# Patient Record
Sex: FEMALE | Race: OTHER
[De-identification: names, ages, dates, MRNs, and addresses within clinical notes are randomized per-mention and may not be internally consistent; named-entity substitution may affect disease eponyms.]

---

## 2018-01-26 ENCOUNTER — HOSPITAL ENCOUNTER (INPATIENT)
Dept: HOSPITAL 72 - EMR | Age: 73
LOS: 1 days | Discharge: HOME | DRG: 392 | End: 2018-01-27
Payer: MEDICARE

## 2018-01-26 VITALS — DIASTOLIC BLOOD PRESSURE: 63 MMHG | SYSTOLIC BLOOD PRESSURE: 156 MMHG

## 2018-01-26 VITALS — DIASTOLIC BLOOD PRESSURE: 55 MMHG | SYSTOLIC BLOOD PRESSURE: 137 MMHG

## 2018-01-26 VITALS — SYSTOLIC BLOOD PRESSURE: 125 MMHG | DIASTOLIC BLOOD PRESSURE: 59 MMHG

## 2018-01-26 VITALS — SYSTOLIC BLOOD PRESSURE: 137 MMHG | DIASTOLIC BLOOD PRESSURE: 53 MMHG

## 2018-01-26 VITALS — BODY MASS INDEX: 23.56 KG/M2 | HEIGHT: 60 IN | WEIGHT: 120 LBS

## 2018-01-26 DIAGNOSIS — Z88.8: ICD-10-CM

## 2018-01-26 DIAGNOSIS — R19.00: Primary | ICD-10-CM

## 2018-01-26 DIAGNOSIS — E78.5: ICD-10-CM

## 2018-01-26 DIAGNOSIS — E11.9: ICD-10-CM

## 2018-01-26 DIAGNOSIS — C85.90: ICD-10-CM

## 2018-01-26 DIAGNOSIS — I10: ICD-10-CM

## 2018-01-26 DIAGNOSIS — R10.13: ICD-10-CM

## 2018-01-26 LAB
ADD MANUAL DIFF: NO
ALBUMIN SERPL-MCNC: 3.9 G/DL (ref 3.4–5)
ALBUMIN/GLOB SERPL: 0.9 {RATIO} (ref 1–2.7)
ALP SERPL-CCNC: 93 U/L (ref 46–116)
ALT SERPL-CCNC: 13 U/L (ref 12–78)
ANION GAP SERPL CALC-SCNC: 9 MMOL/L (ref 5–15)
APPEARANCE UR: CLEAR
APTT PPP: (no result) S
AST SERPL-CCNC: 24 U/L (ref 15–37)
BASOPHILS NFR BLD AUTO: 0.9 % (ref 0–2)
BILIRUB SERPL-MCNC: 0.4 MG/DL (ref 0.2–1)
BUN SERPL-MCNC: 15 MG/DL (ref 7–18)
CALCIUM SERPL-MCNC: 9.7 MG/DL (ref 8.5–10.1)
CHLORIDE SERPL-SCNC: 98 MMOL/L (ref 98–107)
CO2 SERPL-SCNC: 25 MMOL/L (ref 21–32)
CREAT SERPL-MCNC: 1 MG/DL (ref 0.55–1.3)
EOSINOPHIL NFR BLD AUTO: 6.5 % (ref 0–3)
ERYTHROCYTE [DISTWIDTH] IN BLOOD BY AUTOMATED COUNT: 11.8 % (ref 11.6–14.8)
GLOBULIN SER-MCNC: 4.3 G/DL
GLUCOSE UR STRIP-MCNC: NEGATIVE MG/DL
HCT VFR BLD CALC: 35.3 % (ref 37–47)
HGB BLD-MCNC: 12.1 G/DL (ref 12–16)
KETONES UR QL STRIP: NEGATIVE
LEUKOCYTE ESTERASE UR QL STRIP: (no result)
LYMPHOCYTES NFR BLD AUTO: 18 % (ref 20–45)
MCV RBC AUTO: 90 FL (ref 80–99)
MONOCYTES NFR BLD AUTO: 8.5 % (ref 1–10)
NEUTROPHILS NFR BLD AUTO: 66 % (ref 45–75)
NITRITE UR QL STRIP: NEGATIVE
PH UR STRIP: 6.5 [PH] (ref 4.5–8)
PLATELET # BLD: 281 K/UL (ref 150–450)
POTASSIUM SERPL-SCNC: 4.1 MMOL/L (ref 3.5–5.1)
PROT UR QL STRIP: NEGATIVE
RBC # BLD AUTO: 3.93 M/UL (ref 4.2–5.4)
SODIUM SERPL-SCNC: 131 MMOL/L (ref 136–145)
SP GR UR STRIP: 1.01 (ref 1–1.03)
UROBILINOGEN UR-MCNC: NORMAL MG/DL (ref 0–1)
WBC # BLD AUTO: 8 K/UL (ref 4.8–10.8)

## 2018-01-26 PROCEDURE — 36415 COLL VENOUS BLD VENIPUNCTURE: CPT

## 2018-01-26 PROCEDURE — 87086 URINE CULTURE/COLONY COUNT: CPT

## 2018-01-26 PROCEDURE — 81003 URINALYSIS AUTO W/O SCOPE: CPT

## 2018-01-26 PROCEDURE — 85025 COMPLETE CBC W/AUTO DIFF WBC: CPT

## 2018-01-26 PROCEDURE — 74177 CT ABD & PELVIS W/CONTRAST: CPT

## 2018-01-26 PROCEDURE — 83690 ASSAY OF LIPASE: CPT

## 2018-01-26 PROCEDURE — 87181 SC STD AGAR DILUTION PER AGT: CPT

## 2018-01-26 PROCEDURE — 99285 EMERGENCY DEPT VISIT HI MDM: CPT

## 2018-01-26 PROCEDURE — 80053 COMPREHEN METABOLIC PANEL: CPT

## 2018-01-26 PROCEDURE — 84484 ASSAY OF TROPONIN QUANT: CPT

## 2018-01-26 PROCEDURE — 82962 GLUCOSE BLOOD TEST: CPT

## 2018-01-26 RX ADMIN — INSULIN ASPART SCH UNITS: 100 INJECTION, SOLUTION INTRAVENOUS; SUBCUTANEOUS at 20:17

## 2018-01-26 RX ADMIN — INSULIN ASPART SCH UNITS: 100 INJECTION, SOLUTION INTRAVENOUS; SUBCUTANEOUS at 20:31

## 2018-01-26 RX ADMIN — HEPARIN SODIUM SCH UNITS: 5000 INJECTION INTRAVENOUS; SUBCUTANEOUS at 20:16

## 2018-01-26 NOTE — EMERGENCY ROOM REPORT
History of Present Illness


General


Chief Complaint:  Abdominal Pain


Source:  Patient





Present Illness


HPI


72-year-old female presents ED for evaluation.  Patient presenting with 

abdominal pain.  Epigastric, 7/10, nonradiating.  Worse with eating.  On and 

off the last 2 months.  Unrelieved with medications.  Denies chest pain or 

shortness of breath.  Denies nausea or vomiting.  No other aggravating 

relieving factors.  Denies any other associated symptoms


Allergies:  


Coded Allergies:  


     FLUORESCEIN (Verified  Allergy, Unknown, 1/26/18)





Patient History


Past Medical History:  DM, HTN


Past Surgical History:  none


Pertinent Family History:  none


Social History:  Denies: smoking, alcohol use, drug use


Pregnant Now:  No


Immunizations:  UTD


Reviewed Nursing Documentation:  PMH: Agreed, PSxH: Agreed





Nursing Documentation-PMH


Past Medical History:  No History, Except For


Hx Hypertension:  Yes


Hx Diabetes:  Yes





Review of Systems


All Other Systems:  negative except mentioned in HPI





Physical Exam





Vital Signs








  Date Time  Temp Pulse Resp B/P (MAP) Pulse Ox O2 Delivery O2 Flow Rate FiO2


 


1/26/18 10:27 97.9 80 16 182/72 98 Room Air  








Sp02 EP Interpretation:  reviewed, normal


General Appearance:  no apparent distress, alert, GCS 15, non-toxic


Head:  normocephalic, atraumatic


Eyes:  bilateral eye normal inspection, bilateral eye PERRL


ENT:  hearing grossly normal, normal pharynx, no angioedema, normal voice


Neck:  full range of motion, supple/symm/no masses


Respiratory:  chest non-tender, lungs clear, normal breath sounds, speaking 

full sentences


Cardiovascular #1:  regular rate, rhythm, no edema


Cardiovascular #2:  2+ carotid (R), 2+ carotid (L), 2+ radial (R), 2+ radial (L)

, 2+ dorsalis pedis (R), 2+ dorsalis pedis (L)


Gastrointestinal:  normal bowel sounds, soft, non-distended, no guarding, no 

rebound, tenderness


Rectal:  deferred


Genitourinary:  normal inspection, no CVA tenderness


Musculoskeletal:  back normal, gait/station normal, normal range of motion, non-

tender


Neurologic:  alert, oriented x3, responsive, motor strength/tone normal, 

sensory intact, speech normal


Psychiatric:  judgement/insight normal, memory normal, mood/affect normal, no 

suicidal/homicidal ideation


Reflexes:  3+ bicep (R), 3+ bicep (L), 3+ tricep (R), 3+ tricep (L), 3+ knee (R)

, 3+ knee (L)


Skin:  normal color, no rash, warm/dry, well hydrated


Lymphatic:  no adenopathy





Medical Decision Making


Diagnostic Impression:  


 Primary Impression:  


 Abdominal mass


 Qualified Codes:  R19.00 - Intra-abdominal and pelvic swelling, mass and lump, 

unspecified site


 Additional Impression:  


 Abdominal pain


 Qualified Codes:  R10.9 - Unspecified abdominal pain


ER Course


Hospital Course 


72-year-old female presents to ED with abdominal pain x2 month





Differential diagnoses include: BPH, cystitis, pyelonephritis, kidney stone 





Clinical course


Patient placed on stretcher.  Cardiac monitor.  After initial history and 

physical I ordered labs, IV fluids, UA, pain medication and CT scan 





Labs - no leukocytosis, Hb/Hct stable, electrolytes ok


CT abdomen and pelvis - abdominal mass noted





patient contiues to have pain despite adequate analgesia





Case discussed with Dr. Teran (admits for Dr Broderick) and he agreed to accept 

the patient to his service for further care and support 





I feel this is a highly complex case requiring extensive working including EKG/

Rhythm strip, Xray/CT/US, Blood/urine lab work, repeat exams while in ED, and 

administration of strong opiates/narcotics for pain control, admission to 

hospital or close patient follow up.  





Diagnosis - abdominal pain, abdominal mass





Patient admitted to floor in serious condition





Labs








Test


  1/26/18


10:42


 


White Blood Count


  8.0 K/UL


(4.8-10.8)


 


Red Blood Count


  3.93 M/UL


(4.20-5.40)


 


Hemoglobin


  12.1 G/DL


(12.0-16.0)


 


Hematocrit


  35.3 %


(37.0-47.0)


 


Mean Corpuscular Volume 90 FL (80-99) 


 


Mean Corpuscular Hemoglobin


  30.7 PG


(27.0-31.0)


 


Mean Corpuscular Hemoglobin


Concent 34.2 G/DL


(32.0-36.0)


 


Red Cell Distribution Width


  11.8 %


(11.6-14.8)


 


Platelet Count


  281 K/UL


(150-450)


 


Mean Platelet Volume


  5.9 FL


(6.5-10.1)


 


Neutrophils (%) (Auto)


  66.0 %


(45.0-75.0)


 


Lymphocytes (%) (Auto)


  18.0 %


(20.0-45.0)


 


Monocytes (%) (Auto)


  8.5 %


(1.0-10.0)


 


Eosinophils (%) (Auto)


  6.5 %


(0.0-3.0)


 


Basophils (%) (Auto)


  0.9 %


(0.0-2.0)


 


Urine Color Pale yellow 


 


Urine Appearance Clear 


 


Urine pH 6.5 (4.5-8.0) 


 


Urine Specific Gravity


  1.010


(1.005-1.035)


 


Urine Protein


  Negative


(NEGATIVE)


 


Urine Glucose (UA)


  Negative


(NEGATIVE)


 


Urine Ketones


  Negative


(NEGATIVE)


 


Urine Occult Blood 1+ (NEGATIVE) 


 


Urine Nitrite


  Negative


(NEGATIVE)


 


Urine Bilirubin


  Negative


(NEGATIVE)


 


Urine Urobilinogen


  Normal MG/DL


(0.0-1.0)


 


Urine Leukocyte Esterase 3+ (NEGATIVE) 


 


Urine RBC


  0-2 /HPF (0 -


2)


 


Urine WBC


  10-15 /HPF (0


- 2)


 


Urine Squamous Epithelial


Cells Few /LPF


(NONE/OCC)


 


Urine Bacteria


  Few /HPF


(NONE)


 


Sodium Level


  131 MMOL/L


(136-145)


 


Potassium Level


  4.1 MMOL/L


(3.5-5.1)


 


Chloride Level


  98 MMOL/L


()


 


Carbon Dioxide Level


  25 MMOL/L


(21-32)


 


Anion Gap


  9 mmol/L


(5-15)


 


Blood Urea Nitrogen


  15 mg/dL


(7-18)


 


Creatinine


  1.0 MG/DL


(0.55-1.30)


 


Estimat Glomerular Filtration


Rate  mL/min (>60) 


 


 


Glucose Level


  106 MG/DL


()


 


Calcium Level


  9.7 MG/DL


(8.5-10.1)


 


Total Bilirubin


  0.4 MG/DL


(0.2-1.0)


 


Aspartate Amino Transf


(AST/SGOT) 24 U/L (15-37) 


 


 


Alanine Aminotransferase


(ALT/SGPT) 13 U/L (12-78) 


 


 


Alkaline Phosphatase


  93 U/L


()


 


Troponin I


  0.000 ng/mL


(0.000-0.056)


 


Total Protein


  8.2 G/DL


(6.4-8.2)


 


Albumin


  3.9 G/DL


(3.4-5.0)


 


Globulin 4.3 g/dL 


 


Albumin/Globulin Ratio 0.9 (1.0-2.7) 


 


Lipase


  133 U/L


()








CT/MRI/US Diagnostic Results


CT/MRI/US Diagnostic Results :  


   Imaging Test Ordered:  CT A/P


   Impression


Extensive confluent retroperitoneal and mesenteric root mass/adenopathy,


as detailed above. Most likely represents lymphoma. Other differential 

possibilities


include sarcoma, GIST. Tissue sampling is recommended





Last Vital Signs








  Date Time  Temp Pulse Resp B/P (MAP) Pulse Ox O2 Delivery O2 Flow Rate FiO2


 


1/26/18 13:50  76 13 137/53 98 Room Air  


 


1/26/18 10:53 97.9       








Status:  improved


Disposition:  ADMITTED AS INPATIENT


Condition:  Serious


Scripts


Unable to Obtain Active Prescriptions or Reported Meds


Referrals:  


NON PHYSICIAN (PCP)











AMBER RAMIREZ M.D. Jan 26, 2018 14:45

## 2018-01-26 NOTE — DIAGNOSTIC IMAGING REPORT
Clinical Indication: Lower abdominal pain

 

Technique:   No oral contrast utilized, per emergency room physician request  IV

administration nonionic contrast. Venous phase spiral acquisition obtained through

the abdomen and pelvis. Multiplanar reconstructions were generated. Total dose length

product 603.96 mGycm. CTDIvol(s) 13.48 mGy. Dose reduction achieved using automated

exposure control

 

 

Comparison: none

 

Findings: Extensive contiguous confluent mass/adenopathy in the retroperitoneum and

mesenteric root. These demonstrate homogeneous attenuation. The retroperitoneal

component surrounds the abdominal aorta, the bilateral renal arteries, the inferior

mesenteric artery, and the bilateral common iliac arteries. It compresses and

probably occludes the left renal vein. It extends from the level of the celiac origin

to below the aortic bifurcation. This mass measures overall approximately 9 cm

transverse by 6 cm AP by 17 cm craniocaudad.

 

Largely  from the retroperitoneal mass but connected to 2 a by a short

connection cephalad is a is interpreted mass which measures approximately 8.7 cm

transverse by 4.3 cm AP by 13 cm craniocaudad. This to large extent surrounds the

superior mesenteric artery but is mostly  from it by a fat plane. Some of

the superior mesenteric artery branches are completely surrounded by the mass, as is

the distal termination. It also contacts the anterior aspect of the superior

mesenteric vein and compresses it somewhat. A tail of this mass extends into the

rakesh hepatis, where it surrounds the posterior aspect of the portal vein. It

displaces the pancreas anteriorly and is contiguous with the posterior aspect of the

splenic vein. The splenic artery is completely surrounded by this mass, as is a

portion of the origin of the left gastric artery. This also extends cephalad into the

lesser sac. There are some contiguous but probably discrete rakesh hepatis nodes as

well There is also retrocrural lymphadenopathy which appears to be separate from the

other lesions. Para-aortic lymphadenopathy is seen adjacent to the lower thoracic

esophagus. A few discrete nodules are also seen in the mesenteric root.

 

The liver is unremarkable. The gallbladder contains multiple gallstones. The bile

ducts are nondilated. The pancreas, spleen, adrenals are unremarkable. Right kidney

demonstrates subcentimeter low-attenuation lesions which are too small to

characterize. There is mild fullness to the right renal collecting system but no

katrin hydronephrosis. Left kidney is unremarkable. Uterus and adnexal structures are

unremarkable. No pelvic adenopathy demonstrated.

 

The appendix is not identified, but no findings to suggest acute appendicitis are

evident. There are scattered colonic diverticula. No evidence of diverticulitis. No

small bowel distention. Trace free fluid is seen within the pelvic cul-de-sac. The

distal esophagus is slightly thickened. The stomach is somewhat displaced on its

lesser curvature by the above-described mass as well as the antrum being displaced

anteriorly, but is otherwise unremarkable duodenum is unremarkable. The distal

duodenum and ligament of Treitz traverses between the retroperitoneal and the

mesenteric root masses, but is probably not significantly compressed by them. A 1.7

cm benign-appearing, cystic lesion is seen in the left adnexa.  No additional imaging

or follow-up is recommended.

 

The included lung bases demonstrate posterior dependent atelectatic changes. The

heart is borderline enlarged. The bones demonstrate a superior endplate compression

fracture deformity of the L4 vertebral body. The margins of this appears sclerotic.

There is slight anterior offset of L5 on S1, no evidence of associated spondylolysis.

 

Impression: Extensive confluent retroperitoneal and mesenteric root mass/adenopathy,

as detailed above. Most likely represents lymphoma. Other differential possibilities

include sarcoma, GIST. Tissue sampling is recommended

 

Cholelithiasis

 

Diverticulosis. No evidence of diverticulitis

 

Slight thickening of the distal esophagus, may be related to the above suspected

neoplastic pathology, but could also represent mild esophagitis changes

 

L4 vertebral body superior endplate compression fracture deformity, age indeterminate

although suspect chronic. Correlate with clinical findings, consider MRI for better

characterization if consider clinically significant

 

Subcentimeter low-attenuation right renal lesions, too small to characterize, most

likely benign simple cysts.

 

Posterior dependent pulmonary atelectatic changes.

 

Borderline cardiomegaly

 

The CT scanner at [facility] is accredited by the American College of

Radiology and the scans are performed using protocols designed to limit radiation

exposure to as low as reasonably achievable to attain images of sufficient resolution

adequate for diagnostic evaluation.

 

Recommendations for adnexal lesion management based on Deon, et al., J Am Rama

Radiol 10:675-81 (2013).

## 2018-01-27 VITALS — SYSTOLIC BLOOD PRESSURE: 116 MMHG | DIASTOLIC BLOOD PRESSURE: 57 MMHG

## 2018-01-27 VITALS — DIASTOLIC BLOOD PRESSURE: 67 MMHG | SYSTOLIC BLOOD PRESSURE: 134 MMHG

## 2018-01-27 VITALS — DIASTOLIC BLOOD PRESSURE: 63 MMHG | SYSTOLIC BLOOD PRESSURE: 118 MMHG

## 2018-01-27 VITALS — DIASTOLIC BLOOD PRESSURE: 69 MMHG | SYSTOLIC BLOOD PRESSURE: 134 MMHG

## 2018-01-27 RX ADMIN — INSULIN ASPART SCH UNITS: 100 INJECTION, SOLUTION INTRAVENOUS; SUBCUTANEOUS at 06:11

## 2018-01-27 RX ADMIN — HEPARIN SODIUM SCH UNITS: 5000 INJECTION INTRAVENOUS; SUBCUTANEOUS at 08:44

## 2018-01-27 NOTE — HISTORY AND PHYSICAL REPORT
DATE OF ADMISSION:  01/26/2018



CHIEF COMPLAINT:  Abdominal pain.



HISTORY OF PRESENT ILLNESS:  The patient is a pleasant 72-year-old female,

who presented with complaints of midepigastric abdominal pain for two

months.  Pain has been constant and has been worsening.  She has had some

anorexia, but no nausea, no vomiting, no diarrhea.  On evaluation in the

emergency room, the patient's CAT scan showed diffuse masses in the

retroperitoneum concerning for a lymphoma.



PAST MEDICAL HISTORY:  As above.



PAST SURGICAL HISTORY:  Includes foot surgery and right wrist surgery.



CURRENT MEDICATIONS:  Reconciled and reviewed.



ALLERGIES:  None.



FAMILY HISTORY:  None.



SOCIAL HISTORY:  Negative for tobacco, ethanol, or drugs.



REVIEW OF SYSTEMS:  GENERAL:  No fever, chills, or night sweats.  HEENT:

No headaches or visual changes.  CARDIOPULMONARY:  No chest pain or

shortness of breath.  GASTROINTESTINAL:  No nausea or vomiting.  Positive

midepigastric abdominal pain.  GENITOURINARY:  No urgency or frequency.

MUSCULOSKELETAL:  No joint pain or swelling.  NEUROLOGIC:  No evidence of

seizures.



PHYSICAL EXAMINATION:

VITAL SIGNS:  Temperature 98 degrees, blood pressure 120/76, pulse 80, and

respirations 20.

GENERAL:  The patient is well-developed thin female, in no apparent

distress.

NECK:  Supple.  There is no adenopathy noted.

HEART:  Regular rate and rhythm.

LUNGS:  Clear.

ABDOMEN:  Soft.  Minimally tender in the epigastric region.

EXTREMITIES:  No clubbing, cyanosis, or edema.



LABORATORY AND DIAGNOSTIC DATA:  Laboratories were unremarkable.  CT shows

large and diffuse retroperitoneal mass concerning for lymphoma.



PLAN:  The patient's pain is currently controlled.  She will be discharged

with instructions to go to [facility] for further evaluation as there are no

Hemotology/Oncology services available here at the hospital that are able

to deal with this.  Plan of care has been discussed with the patient's

daughter and she is in agreement.









  ______________________________________________

  Bharat Teran M.D.





DR:  REN

D:  01/27/2018 09:29

T:  01/27/2018 16:46

JOB#:  [id number]

CC:

## 2018-01-29 NOTE — DISCHARGE SUMMARY
Discharge Summary


Hospital Course


Date of Admission


Jan 26, 2018 at 13:25


Date of Discharge


Jan 27, 2018 at 11:21


Admitting Diagnosis


abdominal pain/abdominal mass


HPI


Dana Miranda is a 72 year old female who was admitted on Jan 26, 2018 at 13:

25 for Abdominal Pain, Abdominal Mass


Hospital Course


dc summary #[id number]





Discharge Medications


Continued Medications:  


Amlodipine Besylate (Norvasc) 5 Mg Tablet


5 MG ORAL DAILY, TAB





Aspirin* (Aspir 81*) 81 Mg Tablet.dr


81 MG ORAL DAILY, TAB





Atorvastatin Calcium* (Lipitor*) 10 Mg Tablet


10 MG ORAL BEDTIME, TAB





Metformin HCl (Metformin HCl ER) 500 Mg [id number]


500 MG PO BID, TAB





Valsartan (Diovan) 320 Mg Tablet


320 MG ORAL DAILY, TAB











Discharge


Condition Upon Discharge:  stable


Discharge Disposition


Patient was discharged to Home (01)


Discharge Diagnoses:  





Discharge Instructions


Discharge Instructions


Special Instructions


I have been assigned to complete a D/C Summary on this account. I was not 

involved in the patient management











Gloria Patterson NP ([place]anchtein) Jan 29, 2018 08:02

## 2018-01-29 NOTE — DISCHARGE SUMMARY 2 SIG
DATE OF ADMISSION:  01/26/2018



DATE OF DISCHARGE:  01/27/2018



REASON FOR ADMISSION:  72-year-old female with the past medical history 

of HTN, DM, hyperlipidemia,  presented to emergency department with complaint 

of midepigastric abdominal pain for two months.  The pain was constant and 

was getting worse.  Upon evaluation in the emergency room, CAT scan revealed 

diffuse masses in the retroperitoneum concerning for lymphoma.



HOSPITAL COURSE:  The patient was admitted.  Pain management  was provided.  

DVT prophylaxis provided.  Antiemetic provided as needed.  Pain was

controlled.  After the pain was controlled, the patient was discharged

with instructions to go to [facility] for further workup

since there were no Hematology/Oncology services available in this hospital

able to deal with management of further workup and management of lymphoma.  

Plan of care was discussed with the patient and the daughter, who were 

in agreement with the plan.  The patient was discharged.



FINAL DIAGNOSES:  



1. Abdominal masses 

2. Probably lymphoma.

3. Abdominal pain, controlled.



DISCHARGE INSTRUCTIONS:  The patient discharged home with instruction 

to go to [facility] for further workup and management.



DISCHARGE MEDICATIONS:  See medication reconciliation list.



 



  ______________________________________________

  Bharat Teran M.D.



I have been assigned to dictate discharge summary on this account and I

was not involved in the patient's management.



  ______________________________________________

  Gloria Manzanocindy) N.P.





DR:  Kade

D:  01/29/2018 08:01

T:  01/29/2018 23:36

JOB#:  [id number]

CC:



DIAMOND